# Patient Record
Sex: FEMALE | Race: WHITE | NOT HISPANIC OR LATINO | URBAN - METROPOLITAN AREA
[De-identification: names, ages, dates, MRNs, and addresses within clinical notes are randomized per-mention and may not be internally consistent; named-entity substitution may affect disease eponyms.]

---

## 2017-11-29 ENCOUNTER — APPOINTMENT (OUTPATIENT)
Dept: URBAN - METROPOLITAN AREA CLINIC 219 | Age: 82
Setting detail: DERMATOLOGY
End: 2017-11-29

## 2017-11-29 DIAGNOSIS — L70.8 OTHER ACNE: ICD-10-CM

## 2017-11-29 DIAGNOSIS — R23.3 SPONTANEOUS ECCHYMOSES: ICD-10-CM

## 2017-11-29 DIAGNOSIS — L85.3 XEROSIS CUTIS: ICD-10-CM

## 2017-11-29 DIAGNOSIS — L30.4 ERYTHEMA INTERTRIGO: ICD-10-CM

## 2017-11-29 PROCEDURE — OTHER MIPS QUALITY: OTHER

## 2017-11-29 PROCEDURE — OTHER TREATMENT REGIMEN: OTHER

## 2017-11-29 PROCEDURE — 99214 OFFICE O/P EST MOD 30 MIN: CPT

## 2017-11-29 PROCEDURE — OTHER REASSURANCE: OTHER

## 2017-11-29 PROCEDURE — OTHER PRESCRIPTION: OTHER

## 2017-11-29 RX ORDER — ECONAZOLE NITRATE 10 MG/G
APPLY CREAM TOPICAL
Qty: 1 | Refills: 5 | Status: ERX

## 2017-11-29 ASSESSMENT — LOCATION DETAILED DESCRIPTION DERM
LOCATION DETAILED: INFERIOR THORACIC SPINE
LOCATION DETAILED: RIGHT SUPERIOR UPPER BACK
LOCATION DETAILED: LEFT DISTAL PRETIBIAL REGION
LOCATION DETAILED: RIGHT DISTAL PRETIBIAL REGION
LOCATION DETAILED: RIGHT PROXIMAL DORSAL FOREARM
LOCATION DETAILED: RIGHT ULNAR DORSAL HAND
LOCATION DETAILED: LEFT ULNAR DORSAL HAND
LOCATION DETAILED: LEFT PROXIMAL DORSAL FOREARM

## 2017-11-29 ASSESSMENT — LOCATION SIMPLE DESCRIPTION DERM
LOCATION SIMPLE: LEFT FOREARM
LOCATION SIMPLE: RIGHT HAND
LOCATION SIMPLE: RIGHT PRETIBIAL REGION
LOCATION SIMPLE: LEFT PRETIBIAL REGION
LOCATION SIMPLE: RIGHT FOREARM
LOCATION SIMPLE: LEFT HAND
LOCATION SIMPLE: RIGHT UPPER BACK
LOCATION SIMPLE: UPPER BACK

## 2017-11-29 ASSESSMENT — LOCATION ZONE DERM
LOCATION ZONE: LEG
LOCATION ZONE: TRUNK
LOCATION ZONE: ARM
LOCATION ZONE: HAND

## 2017-11-29 NOTE — PROCEDURE: TREATMENT REGIMEN
Plan: Increase Emollients\\nMild Cleansers
Detail Level: Simple
Samples Given: Vaniply Ointment
Detail Level: Zone
Continue Regimen: May apply cotton fabric or t-shirts between folds of skin\\nZeasorb AF Powder\\nIncrease Econazole Cream twice a day as needed \\nEncouraged to keep area dry

## 2018-11-07 ENCOUNTER — APPOINTMENT (OUTPATIENT)
Dept: URBAN - METROPOLITAN AREA CLINIC 219 | Age: 83
Setting detail: DERMATOLOGY
End: 2018-11-07

## 2018-11-07 DIAGNOSIS — L85.3 XEROSIS CUTIS: ICD-10-CM

## 2018-11-07 DIAGNOSIS — L30.4 ERYTHEMA INTERTRIGO: ICD-10-CM

## 2018-11-07 DIAGNOSIS — L57.0 ACTINIC KERATOSIS: ICD-10-CM

## 2018-11-07 PROBLEM — F32.9 MAJOR DEPRESSIVE DISORDER, SINGLE EPISODE, UNSPECIFIED: Status: ACTIVE | Noted: 2018-11-07

## 2018-11-07 PROBLEM — I10 ESSENTIAL (PRIMARY) HYPERTENSION: Status: ACTIVE | Noted: 2018-11-07

## 2018-11-07 PROBLEM — F41.9 ANXIETY DISORDER, UNSPECIFIED: Status: ACTIVE | Noted: 2018-11-07

## 2018-11-07 PROBLEM — M12.9 ARTHROPATHY, UNSPECIFIED: Status: ACTIVE | Noted: 2018-11-07

## 2018-11-07 PROCEDURE — 17000 DESTRUCT PREMALG LESION: CPT

## 2018-11-07 PROCEDURE — 99213 OFFICE O/P EST LOW 20 MIN: CPT | Mod: 25

## 2018-11-07 PROCEDURE — OTHER MIPS QUALITY: OTHER

## 2018-11-07 PROCEDURE — OTHER TREATMENT REGIMEN: OTHER

## 2018-11-07 PROCEDURE — OTHER LIQUID NITROGEN: OTHER

## 2018-11-07 PROCEDURE — OTHER PRESCRIPTION: OTHER

## 2018-11-07 RX ORDER — ECONAZOLE NITRATE 10 MG/G
APPLY CREAM TOPICAL
Qty: 1 | Refills: 3 | Status: ERX

## 2018-11-07 RX ORDER — FLUCONAZOLE 150 MG/1
TABLET ORAL ONCE A DAY
Qty: 3 | Refills: 0 | Status: ERX | COMMUNITY
Start: 2018-11-07

## 2018-11-07 ASSESSMENT — LOCATION SIMPLE DESCRIPTION DERM
LOCATION SIMPLE: RIGHT PRETIBIAL REGION
LOCATION SIMPLE: RIGHT FOREARM
LOCATION SIMPLE: LEFT PRETIBIAL REGION
LOCATION SIMPLE: LEFT HAND
LOCATION SIMPLE: RIGHT HAND
LOCATION SIMPLE: LEFT FOREARM

## 2018-11-07 ASSESSMENT — LOCATION ZONE DERM
LOCATION ZONE: HAND
LOCATION ZONE: ARM
LOCATION ZONE: LEG

## 2018-11-07 ASSESSMENT — LOCATION DETAILED DESCRIPTION DERM
LOCATION DETAILED: RIGHT PROXIMAL DORSAL FOREARM
LOCATION DETAILED: RIGHT DISTAL PRETIBIAL REGION
LOCATION DETAILED: LEFT DISTAL PRETIBIAL REGION
LOCATION DETAILED: RIGHT ULNAR DORSAL HAND
LOCATION DETAILED: LEFT PROXIMAL DORSAL FOREARM
LOCATION DETAILED: LEFT ULNAR DORSAL HAND

## 2018-11-07 NOTE — PROCEDURE: TREATMENT REGIMEN
Continue Regimen: May apply cotton fabric or t-shirts between folds of skin\\nZeasorb AF Powder\\nIncrease Econazole Cream twice a day as needed \\nEncouraged to keep area dry
Modify Regimen: Add Diflucan 150 mg once a day x3 days
Plan: Increase Emollients\\nMild Cleansers
Detail Level: Simple
Detail Level: Zone
Samples Given: Vaniply Ointment

## 2018-11-07 NOTE — PROCEDURE: LIQUID NITROGEN
Detail Level: Detailed
Render Post-Care Instructions In Note?: no
Duration Of Freeze Thaw-Cycle (Seconds): 0
Number Of Freeze-Thaw Cycles: 1 freeze-thaw cycle
Post-Care Instructions: I reviewed with the patient in detail post-care instructions. Patient is to wear sunprotection, and avoid picking at any of the treated lesions. Pt may apply Vaseline to crusted or scabbing areas.
Consent: The patient's consent was obtained including but not limited to risks of crusting, scabbing, blistering, scarring, darker or lighter pigmentary change, recurrence, incomplete removal and infection.

## 2019-02-25 ENCOUNTER — APPOINTMENT (OUTPATIENT)
Dept: URBAN - METROPOLITAN AREA CLINIC 219 | Age: 84
Setting detail: DERMATOLOGY
End: 2019-02-25

## 2019-02-25 DIAGNOSIS — L57.0 ACTINIC KERATOSIS: ICD-10-CM

## 2019-02-25 PROBLEM — E13.9 OTHER SPECIFIED DIABETES MELLITUS WITHOUT COMPLICATIONS: Status: ACTIVE | Noted: 2019-02-25

## 2019-02-25 PROBLEM — D48.5 NEOPLASM OF UNCERTAIN BEHAVIOR OF SKIN: Status: ACTIVE | Noted: 2019-02-25

## 2019-02-25 PROCEDURE — OTHER MIPS QUALITY: OTHER

## 2019-02-25 PROCEDURE — OTHER DEFER: OTHER

## 2019-02-25 PROCEDURE — 17003 DESTRUCT PREMALG LES 2-14: CPT

## 2019-02-25 PROCEDURE — 99212 OFFICE O/P EST SF 10 MIN: CPT | Mod: 25

## 2019-02-25 PROCEDURE — 17000 DESTRUCT PREMALG LESION: CPT

## 2019-02-25 PROCEDURE — OTHER LIQUID NITROGEN: OTHER

## 2019-02-25 ASSESSMENT — LOCATION DETAILED DESCRIPTION DERM
LOCATION DETAILED: RIGHT RADIAL DORSAL HAND
LOCATION DETAILED: RIGHT ULNAR DORSAL HAND

## 2019-02-25 ASSESSMENT — LOCATION ZONE DERM: LOCATION ZONE: HAND

## 2019-02-25 ASSESSMENT — LOCATION SIMPLE DESCRIPTION DERM: LOCATION SIMPLE: RIGHT HAND

## 2019-02-25 NOTE — PROCEDURE: DEFER
Introduction Text (Please End With A Colon): The following procedure was deferred:
Detail Level: Simple
Procedure To Be Performed At Next Visit: Other
Other Procedure: Shave / Destruction
Instructions (Optional): Spoke with Dr. Mercado ( pts nephrologist) and Patient can not have procedure this week due to a vascular surgery on the left arm in 2 days

## 2019-03-11 ENCOUNTER — APPOINTMENT (OUTPATIENT)
Dept: URBAN - METROPOLITAN AREA CLINIC 219 | Age: 84
Setting detail: DERMATOLOGY
End: 2019-03-13

## 2019-03-11 DIAGNOSIS — L57.0 ACTINIC KERATOSIS: ICD-10-CM

## 2019-03-11 PROBLEM — D48.5 NEOPLASM OF UNCERTAIN BEHAVIOR OF SKIN: Status: ACTIVE | Noted: 2019-03-11

## 2019-03-11 PROCEDURE — OTHER SHAVE REMOVAL AND DESTRUCTION: OTHER

## 2019-03-11 PROCEDURE — 17000 DESTRUCT PREMALG LESION: CPT

## 2019-03-11 PROCEDURE — OTHER LIQUID NITROGEN: OTHER

## 2019-03-11 PROCEDURE — 17272 DSTR MAL LES S/N/H/F/G 1.1-2: CPT | Mod: 59

## 2019-03-11 PROCEDURE — 17003 DESTRUCT PREMALG LES 2-14: CPT

## 2019-03-11 ASSESSMENT — LOCATION SIMPLE DESCRIPTION DERM
LOCATION SIMPLE: RIGHT WRIST
LOCATION SIMPLE: RIGHT HAND

## 2019-03-11 ASSESSMENT — LOCATION DETAILED DESCRIPTION DERM
LOCATION DETAILED: RIGHT LATERAL DORSAL WRIST
LOCATION DETAILED: RIGHT DORSAL WRIST
LOCATION DETAILED: RIGHT RADIAL DORSAL HAND

## 2019-03-11 ASSESSMENT — LOCATION ZONE DERM
LOCATION ZONE: ARM
LOCATION ZONE: HAND

## 2019-03-11 NOTE — PROCEDURE: SHAVE REMOVAL AND DESTRUCTION
Was Curettage Performed?: Yes
Bill As?: Note: Bill Malignant Destruction If Path Confirms Malignant Lesion. Only Bill As Shave Removal If Path Comes Back Benign. Do Not Bill Shave Removal On Malignant Lesions.: Malignant Destruction
Billing Type: Third-Party Bill
Dressing: pressure dressing with telfa
Render Post-Care Instructions In Note?: no
Wound Care: Polysporin ointment
Anesthesia Volume In Cc: 2
Post-Care Instructions: I reviewed with the patient in detail post-care instructions. Patient is to keep the biopsy site dry overnight, and then apply polysporin twice daily until healed.
Anesthesia Type: 1% lidocaine with epinephrine and a 1:10 solution of 8.4% sodium bicarbonate
Body Location Override (Optional - Billing Will Still Be Based On Selected Body Map Location If Applicable): left dorsal hand
Anesthesia Volume In Cc: 0
Notification Instructions: Patient will be notified of biopsy results. However, patient instructed to call the office if not contacted within 2 weeks.
Detail Level: Detailed
Size Of Lesion In Cm: 1.4
Hemostasis: Electrodesiccation
Size After Destruction (Required For Destruction Billing): 1.8
Consent: Written consent was obtained and risks were reviewed including but not limited to scarring, infection, bleeding, scabbing, incomplete removal, nerve damage and allergy to anesthesia.
Number Of Curettages: 3